# Patient Record
(demographics unavailable — no encounter records)

---
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
## 2024-11-14 NOTE — HISTORY OF PRESENT ILLNESS
[FreeTextEntry1] : Patient is here for follow-up visit for test of cure from trichomonas vaginalis and bacterial vaginosis as well as ovarian cystic change.  She denies any further metrorrhagia.  She does state that she still experiences some vaginal odor from time to time particularly when urinating.  Dipstick urine today is negative other than trace urobilinogen.

## 2024-11-14 NOTE — DISCUSSION/SUMMARY
[FreeTextEntry1] : Patient is status post-itis with trichomonas and bacterial vaginosis a NAAT test is repeated as well as chlamydia and gonorrhea at patient's request.  The patient has multiple ecchymoses on the inner thighs and knee which she states upon questioning is from working out.  She also notes dyspareunia during mid intercourse and vaginal over-the-counter lubricants have been recommended.  We rediscussed the option of an endometrial biopsy to confirm the etiology of the heterogeneous endometrium and she will defer for now.  I would like her to follow-up in 6 months to repeat same and to advise me if her vaginal symptoms continue